# Patient Record
Sex: MALE | Race: BLACK OR AFRICAN AMERICAN | NOT HISPANIC OR LATINO | Employment: UNEMPLOYED | URBAN - METROPOLITAN AREA
[De-identification: names, ages, dates, MRNs, and addresses within clinical notes are randomized per-mention and may not be internally consistent; named-entity substitution may affect disease eponyms.]

---

## 2020-07-23 VITALS
HEART RATE: 58 BPM | TEMPERATURE: 98.6 F | SYSTOLIC BLOOD PRESSURE: 132 MMHG | BODY MASS INDEX: 28.77 KG/M2 | HEIGHT: 70 IN | DIASTOLIC BLOOD PRESSURE: 86 MMHG | OXYGEN SATURATION: 98 % | WEIGHT: 201 LBS

## 2020-07-23 PROCEDURE — 99282 EMERGENCY DEPT VISIT SF MDM: CPT

## 2020-07-23 ASSESSMENT — MIFFLIN-ST. JEOR: SCORE: 1857.98

## 2020-07-24 ENCOUNTER — HOSPITAL ENCOUNTER (EMERGENCY)
Facility: CLINIC | Age: 35
Discharge: HOME | End: 2020-07-24
Attending: EMERGENCY MEDICINE | Admitting: EMERGENCY MEDICINE
Payer: COMMERCIAL

## 2020-07-24 DIAGNOSIS — S02.5XXB OPEN FRACTURE OF TOOTH, INITIAL ENCOUNTER: ICD-10-CM

## 2020-07-24 RX ORDER — HYDROCODONE BITARTRATE AND ACETAMINOPHEN 5; 325 MG/1; MG/1
1 TABLET ORAL EVERY 6 HOURS PRN
Qty: 10 TABLET | Refills: 0 | Status: SHIPPED | OUTPATIENT
Start: 2020-07-24 | End: 2020-07-27

## 2020-07-24 RX ORDER — PENICILLIN V POTASSIUM 500 MG/1
500 TABLET, FILM COATED ORAL 4 TIMES DAILY
Qty: 40 TABLET | Refills: 0 | Status: SHIPPED | OUTPATIENT
Start: 2020-07-24 | End: 2020-08-03

## 2020-07-24 NOTE — ED TRIAGE NOTES
[place] [facility].  States it is broken/ States was seen at [facility], xrayed and told that he needs to take an antibiotic.  Script given to [facility] but not picked up yet.  Tylenol and advil not helping.  Waiting for appt to have tooth extracted.

## 2020-07-24 NOTE — ED AVS SNAPSHOT
Emergency Department  [address] [place] [id number]  Phone:  [phone]  Fax:  [phone]                                    Viktor Bourgeois   MRN: [id number]    Department:   Emergency Department   Date of Visit:  7/23/2020           After Visit Summary Signature Page    I have received my discharge instructions, and my questions have been answered. I have discussed any challenges I see with this plan with the nurse or doctor.    ..........................................................................................................................................  Patient/Patient Representative Signature      ..........................................................................................................................................  Patient Representative Print Name and Relationship to Patient    ..................................................               ................................................  Date                                   Time    ..........................................................................................................................................  Reviewed by Signature/Title    ...................................................              ..............................................  Date                                               Time          22EPIC Rev 08/18

## 2021-03-26 ENCOUNTER — APPOINTMENT (OUTPATIENT)
Dept: GENERAL RADIOLOGY | Facility: CLINIC | Age: 36
End: 2021-03-26
Attending: EMERGENCY MEDICINE
Payer: COMMERCIAL

## 2021-03-26 ENCOUNTER — HOSPITAL ENCOUNTER (EMERGENCY)
Facility: CLINIC | Age: 36
Discharge: HOME | End: 2021-03-26
Attending: EMERGENCY MEDICINE | Admitting: EMERGENCY MEDICINE
Payer: COMMERCIAL

## 2021-03-26 VITALS
OXYGEN SATURATION: 98 % | BODY MASS INDEX: 28.84 KG/M2 | SYSTOLIC BLOOD PRESSURE: 122 MMHG | HEART RATE: 62 BPM | RESPIRATION RATE: 14 BRPM | DIASTOLIC BLOOD PRESSURE: 77 MMHG | TEMPERATURE: 98.2 F | HEIGHT: 70 IN

## 2021-03-26 DIAGNOSIS — R07.89 CHEST WALL PAIN: ICD-10-CM

## 2021-03-26 LAB
ANION GAP SERPL CALCULATED.3IONS-SCNC: 4 MMOL/L (ref 3–14)
BASOPHILS # BLD AUTO: 0.1 10E9/L (ref 0–0.2)
BASOPHILS NFR BLD AUTO: 0.9 %
BUN SERPL-MCNC: 15 MG/DL (ref 7–30)
CALCIUM SERPL-MCNC: 8.8 MG/DL (ref 8.5–10.1)
CHLORIDE SERPL-SCNC: 107 MMOL/L (ref 94–109)
CO2 SERPL-SCNC: 27 MMOL/L (ref 20–32)
CREAT SERPL-MCNC: 0.82 MG/DL (ref 0.66–1.25)
D DIMER PPP FEU-MCNC: 0.3 UG/ML FEU (ref 0–0.5)
DIFFERENTIAL METHOD BLD: ABNORMAL
EOSINOPHIL # BLD AUTO: 0.2 10E9/L (ref 0–0.7)
EOSINOPHIL NFR BLD AUTO: 2.4 %
ERYTHROCYTE [DISTWIDTH] IN BLOOD BY AUTOMATED COUNT: 11.9 % (ref 10–15)
GFR SERPL CREATININE-BSD FRML MDRD: >90 ML/MIN/{1.73_M2}
GLUCOSE SERPL-MCNC: 104 MG/DL (ref 70–99)
HCT VFR BLD AUTO: 39.4 % (ref 40–53)
HGB BLD-MCNC: 13.6 G/DL (ref 13.3–17.7)
IMM GRANULOCYTES # BLD: 0 10E9/L (ref 0–0.4)
IMM GRANULOCYTES NFR BLD: 0.2 %
INTERPRETATION ECG - MUSE: NORMAL
LABORATORY COMMENT REPORT: NORMAL
LYMPHOCYTES # BLD AUTO: 4.1 10E9/L (ref 0.8–5.3)
LYMPHOCYTES NFR BLD AUTO: 50.4 %
MCH RBC QN AUTO: 30.1 PG (ref 26.5–33)
MCHC RBC AUTO-ENTMCNC: 34.5 G/DL (ref 31.5–36.5)
MCV RBC AUTO: 87 FL (ref 78–100)
MONOCYTES # BLD AUTO: 0.8 10E9/L (ref 0–1.3)
MONOCYTES NFR BLD AUTO: 9.5 %
NEUTROPHILS # BLD AUTO: 3 10E9/L (ref 1.6–8.3)
NEUTROPHILS NFR BLD AUTO: 36.6 %
NRBC # BLD AUTO: 0 10*3/UL
NRBC BLD AUTO-RTO: 0 /100
PLATELET # BLD AUTO: 281 10E9/L (ref 150–450)
POTASSIUM SERPL-SCNC: 3.8 MMOL/L (ref 3.4–5.3)
RBC # BLD AUTO: 4.52 10E12/L (ref 4.4–5.9)
SARS-COV-2 RNA RESP QL NAA+PROBE: NEGATIVE
SODIUM SERPL-SCNC: 138 MMOL/L (ref 133–144)
SPECIMEN SOURCE: NORMAL
TROPONIN I SERPL-MCNC: <0.015 UG/L (ref 0–0.04)
WBC # BLD AUTO: 8 10E9/L (ref 4–11)

## 2021-03-26 PROCEDURE — 84484 ASSAY OF TROPONIN QUANT: CPT | Performed by: EMERGENCY MEDICINE

## 2021-03-26 PROCEDURE — 80048 BASIC METABOLIC PNL TOTAL CA: CPT | Performed by: EMERGENCY MEDICINE

## 2021-03-26 PROCEDURE — 87635 SARS-COV-2 COVID-19 AMP PRB: CPT | Performed by: EMERGENCY MEDICINE

## 2021-03-26 PROCEDURE — 85379 FIBRIN DEGRADATION QUANT: CPT | Performed by: EMERGENCY MEDICINE

## 2021-03-26 PROCEDURE — 93005 ELECTROCARDIOGRAM TRACING: CPT

## 2021-03-26 PROCEDURE — 85025 COMPLETE CBC W/AUTO DIFF WBC: CPT | Performed by: EMERGENCY MEDICINE

## 2021-03-26 PROCEDURE — 250N000013 HC RX MED GY IP 250 OP 250 PS 637: Performed by: EMERGENCY MEDICINE

## 2021-03-26 PROCEDURE — 71046 X-RAY EXAM CHEST 2 VIEWS: CPT

## 2021-03-26 PROCEDURE — C9803 HOPD COVID-19 SPEC COLLECT: HCPCS

## 2021-03-26 PROCEDURE — 99285 EMERGENCY DEPT VISIT HI MDM: CPT | Mod: 25

## 2021-03-26 RX ORDER — IBUPROFEN 600 MG/1
600 TABLET, FILM COATED ORAL ONCE
Status: COMPLETED | OUTPATIENT
Start: 2021-03-26 | End: 2021-03-26

## 2021-03-26 RX ADMIN — IBUPROFEN 600 MG: 600 TABLET ORAL at 08:01

## 2021-03-26 ASSESSMENT — ENCOUNTER SYMPTOMS
APPETITE CHANGE: 0
FEVER: 0
SHORTNESS OF BREATH: 1
LIGHT-HEADEDNESS: 1
COUGH: 0

## 2021-03-26 NOTE — ED TRIAGE NOTES
Patient reports right sided chest pain with SOB. States pain radiates to right shoulder. Pain worse with inspiration. Has been going on for 2 weeks, denies any cough or fever.

## 2021-03-26 NOTE — ED PROVIDER NOTES
"    History     Chief Complaint:  Chest Pain and Shortness of Breath     HPI:   Viktor Bourgeois is a 35 year old male who presents with chest pain and shortness of breath. Patient reports intermittent mid-sternal chest pain and pressure radiating to his shoulders and back for the last 2-3 weeks. This is exacerbated with deep breaths. 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 However, he states his pain is otherwise non exertional. Patient also reports some lightheadedness. He has not taken any medications to relieve his symptoms. Patient is unable to sleep on his left side due to his chest pain, and has been sleeping on his right side. Denies any inciting trauma. Denies fever, cough, leg swelling, appetite change.     Review of Systems   Constitutional: Negative for appetite change and fever.   Respiratory: Positive for shortness of breath. Negative for cough.    Cardiovascular: Positive for chest pain. Negative for leg swelling.   Neurological: Positive for light-headedness.   All other systems reviewed and are negative.    Allergies:  No Known Allergies     Medications:    The patient is not currently taking any prescribed medications.    Past Medical History:    Patient denies any medical problems.    Social History:  Smoking status: yes  Alcohol use: occasional  Patient presents alone.    Physical Exam     Vitals:  Patient Vitals for the past 24 hrs:   BP Temp Pulse Resp SpO2 Height   03/26/21 0708 -- 98.2  F (36.8  C) -- -- -- --   03/26/21 0706 133/71 -- 56 14 100 % 1.778 m (5' 10\")       Physical Exam:  Constitutional: Middle aged black male. Sitting. No respiratory distress.  HENT: No signs of trauma.   Eyes: EOM are normal. Pupils are equal, round, and reactive to light.   Neck: Normal range of motion. No JVD present. No cervical adenopathy.  Cardiovascular: Regular rhythm.  Exam reveals no gallop and no friction rub. 2+ radial pulses bilaterally. No murmur " heard.  Pulmonary/Chest: Bilateral breath sounds normal. No wheezes, rhonchi or rales. Chest wall nontender.  Abdominal: Soft. No tenderness. No rebound or guarding.   Musculoskeletal: No edema. No tenderness.   Lymphadenopathy: No lymphadenopathy.   Neurological: Alert and oriented to person, place, and time. Normal strength. Coordination normal.   Skin: Skin is warm and dry. No rash noted. No erythema.     Emergency Department Course     ECG:  Taken at 07:06:52.  Read by Jimy Matos MD.    Rate 63 bpm. NV interval 172. QRS duration 80. QT/QTc 382/390. P-R-T axes 75 16 44.    NSR with sinus arrhythmia.    Imaging:  XR Chest  Negative chest  Per radiology.    Laboratory:  CBC: WBC 8.0, HGB 13.6,      BMP: Glucose 104 o/w WNL (Creatinine 0.82)     Troponin (Collected 0720): <0.015    D dimer: 0.3    Asymptomatic COVID-19 virus by PCR: neg    Emergency Department Course:  Reviewed:  Past medical records, Care Everywhere, nursing notes, and vitals reviewed.     Assessments:  0724 I performed an exam of the patient and obtained history, as documented above.  0906 Patient rechecked and updated.     Interventions:  0801 Ibuprofen, 600 mg, PO    Disposition:  The patient was discharged to home.     Impression & Plan         Covid-19:  Viktor Bourgeois was evaluated during a global COVID-19 pandemic, which necessitated consideration that the patient might be at risk for infection with the SARS-CoV-2 virus that causes COVID-19.   Applicable protocols for evaluation were followed during the patient's care.   COVID-19 was considered as part of the patient's evaluation. The plan for testing is:  a test was obtained during this visit.    Medical Decision Making:  Viktor Bourgeois is a 35 year old male who presents to the emergency department today for evaluation of chest pain. Patient works with [employer] at times and notes that this particularly makes his pain worse. It has been intermittent and has been going  on for at least a month. 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 He states when he takes a big breath it sometimes hurts. He notices this up in the right shoulder area. He does smoke. Examination here is relatively unremarkable. No respiratory distress. Pulse ox is near 100%. Chest XR, EKG, D dimer, troponin are all unremarkable. COVID was negative. Patient's pain does not seem likely due to coronary disease, PE, or dissection. Most likely it is related to his work with musculoskeletal chest all discomfort. I recommend ibuprofen, cold compresses, and follow up with primary.     Diagnosis:    ICD-10-CM    1. Chest wall pain  R07.89 Asymptomatic SARS-CoV-2 COVID-19 Virus (Coronavirus) by PCR            Scribe Disclosure:  I, Fabiana Lou, am serving as a scribe at 7:24 AM on 3/26/2021 to document services personally performed by Jimy Matos MD based on my observations and the provider's statements to me.       Fabiana Lou  3/26/2021     Jimy Matos MD  03/26/21 1532

## 2022-01-04 ENCOUNTER — HOSPITAL ENCOUNTER (EMERGENCY)
Facility: CLINIC | Age: 37
Discharge: HOME | End: 2022-01-04
Attending: PHYSICIAN ASSISTANT | Admitting: PHYSICIAN ASSISTANT
Payer: COMMERCIAL

## 2022-01-04 VITALS
WEIGHT: 195 LBS | TEMPERATURE: 99.1 F | DIASTOLIC BLOOD PRESSURE: 58 MMHG | RESPIRATION RATE: 18 BRPM | HEIGHT: 70 IN | OXYGEN SATURATION: 98 % | BODY MASS INDEX: 27.92 KG/M2 | HEART RATE: 81 BPM | SYSTOLIC BLOOD PRESSURE: 124 MMHG

## 2022-01-04 DIAGNOSIS — Z20.822 SUSPECTED 2019 NOVEL CORONAVIRUS INFECTION: ICD-10-CM

## 2022-01-04 PROCEDURE — 99283 EMERGENCY DEPT VISIT LOW MDM: CPT

## 2022-01-04 PROCEDURE — 87637 SARSCOV2&INF A&B&RSV AMP PRB: CPT | Performed by: PHYSICIAN ASSISTANT

## 2022-01-04 ASSESSMENT — ENCOUNTER SYMPTOMS
NAUSEA: 0
COUGH: 1
DIARRHEA: 0
DYSURIA: 1
HEADACHES: 1
ABDOMINAL PAIN: 0
SHORTNESS OF BREATH: 0
VOMITING: 0

## 2022-01-04 ASSESSMENT — MIFFLIN-ST. JEOR: SCORE: 1820.76

## 2022-01-05 LAB
FLUAV RNA SPEC QL NAA+PROBE: NEGATIVE
FLUBV RNA RESP QL NAA+PROBE: NEGATIVE
RSV RNA SPEC NAA+PROBE: NEGATIVE
SARS-COV-2 RNA RESP QL NAA+PROBE: POSITIVE

## 2022-01-05 NOTE — ED PROVIDER NOTES
"  History     Chief Complaint:  Covid Concern       37 y/o male presents for COVID testing.  Reports past few days with chills and aches, mild HA, mild cough.  Denies CP/dypsnea.  Denies N/V/D.  Notes some dysuria when asked.     Denies chronic health problems  + COVID vaccinated    Local resident  Student: classes recently canceled due to high COVID numbers but pt does not know of specific exposure  PCP established      The history is provided by the patient and medical records. No  was used.      ROS:  Review of Systems   Constitutional:        Chills. No overt fevers   Respiratory: Positive for cough. Negative for shortness of breath.    Cardiovascular: Negative for chest pain.   Gastrointestinal: Negative for abdominal pain, diarrhea, nausea and vomiting.   Genitourinary: Positive for dysuria.   Neurological: Positive for headaches.   All other systems reviewed and are negative.    Allergies:  No Known Allergies     Medications:    No current outpatient medications on file.    Past Medical History:    History reviewed. No pertinent past medical history.  There is no problem list on file for this patient.     Past Surgical History:    History reviewed. No pertinent surgical history.     Family History:    family history is not on file.    Social History:   reports that he has been smoking. He uses smokeless tobacco. He reports current alcohol use. He reports that he does not use drugs.  PCP: [facility]     Physical Exam   Patient Vitals for the past 24 hrs:   BP Temp Temp src Pulse Resp SpO2 Height Weight   01/04/22 1949 124/58 99.1  F (37.3  C) Oral 81 18 98 % 1.778 m (5' 10\") 88.5 kg (195 lb)        Physical Exam  Vitals and nursing note reviewed.   Constitutional:       General: He is not in acute distress.     Appearance: Normal appearance. He is not ill-appearing, toxic-appearing or diaphoretic.   HENT:      Head: Normocephalic.      Right Ear: External ear " normal.      Left Ear: External ear normal.      Mouth/Throat:      Comments: Mask in place. Clear speech.   Eyes:      Conjunctiva/sclera: Conjunctivae normal.   Cardiovascular:      Rate and Rhythm: Normal rate and regular rhythm.      Pulses: Normal pulses.      Heart sounds: Normal heart sounds.   Pulmonary:      Effort: Pulmonary effort is normal. No respiratory distress.      Breath sounds: Normal breath sounds.   Abdominal:      Palpations: Abdomen is soft.      Tenderness: There is no abdominal tenderness. There is no right CVA tenderness or left CVA tenderness.   Musculoskeletal:         General: Normal range of motion.      Cervical back: Normal range of motion and neck supple. No rigidity.   Skin:     General: Skin is warm.      Capillary Refill: Capillary refill takes less than 2 seconds.   Neurological:      General: No focal deficit present.      Mental Status: He is alert.   Psychiatric:         Mood and Affect: Mood normal.         Behavior: Behavior normal.         Thought Content: Thought content normal.         Judgment: Judgment normal.       Emergency Department Course     Laboratory:  COVID/FLU pending at time of discharge    Emergency Department Course:       Reviewed:  I reviewed nursing notes, vitals and past medical history    Assessments:  2100: I obtained history and examined the patient as noted above.   2134: Call to lab to check on ETA of test results, in labing at [facility] now (sent out due to [facility]). Pt wants to go. Was able to set up his [employer] account.     Disposition:  The patient was discharged to home.     Impression & Plan      Covid-19  Viktor Bourgeois was evaluated during a global COVID-19 pandemic, which necessitated consideration that the patient might be at risk for infection with the SARS-CoV-2 virus that causes COVID-19.   Applicable protocols for evaluation were followed during the patient's care.   COVID-19 was considered as part of the patient's evaluation.  The plan for testing is:  a test was obtained during this visit and pending at time of D/C    Medical Decision Makin y/o male presents for COVID testing.  Reports past few days with chills and aches, mild HA, mild cough.  Denies CP/dypsnea.  Denies N/V/D.  Notes some dysuria when asked.     On exam, well appearing.  Discussed S/S susp for COVID.  Test pending.  Pt initially wanted to stay for results as did not want to return home if positive. However while waiting changed his mind, was able to set up [employer] account and will review his results there.  He is felt stable for discharge as no hypoxia or tachycardia, no CP/dypsnea.  If COVID neg, suspect other viral etiology.  Did note mild dysuria a few days ago on ROS.  No abd pain or flank pain. Offered UA testing, pt initially agreeable but then changed mind and wants to F/u with established PCP instead. This is felt reasonable.  Pt educated on S/S that should prompt ED re-eval.  Questions answered. Verbalized understanding. Comfortable with plan and appreciative.     Diagnosis:    ICD-10-CM    1. Suspected 2019 novel coronavirus infection  Z20.822       Discharge Medications:  New Prescriptions    No medications on file      2022   Nelly Keating PA-C [employer], Leah M, PA-C  [phone]

## 2022-01-05 NOTE — ED TRIAGE NOTES
[facility]  ED Arrival Note    Arrives through triage stating that the thinks that he may have been exposed to covid. Pt c/o generalized muscle pain, intermittent fever and chills and headache since Thursday. Pt states he is vaccinated and wants to be tested to see if he as has been exposed.    Visitors during triage: None    Triage Interventions: N/A and COVID Test    Ambulatory: Yes    Meets Stroke Criteria?: No    Meets Trauma Criteria?: No    Shock Index: N/A, for provider reference    Directed to: Main ED

## 2022-01-05 NOTE — ED NOTES
1st Call at 1913. No response  Suni Seo RN,.......................................... 1/4/2022   7:13 PM

## 2022-01-05 NOTE — DISCHARGE INSTRUCTIONS
-Your symptoms are suspicious for COVID.  A test was sent. You can and should review the results on [employer].   -You have declines urine testing today for reports dysuria. Be sure to follow-up with your primary care provider.  Return to the ER for worsening urinary symptoms, abdominal pain, vomiting, or pain/swelling to your testicles    Discharge Instructions  COVID-19    COVID-19 is the disease caused by a new coronavirus. The virus spreads from person-to-person primarily by droplets when an infected person coughs or sneezes and the droplets are then breathed in by another person. There are tests available to diagnose COVID-19. You may have been diagnosed with COVID, may be being tested for COVID and have a pending test result, or may have been exposed to COVID.    Symptoms of COVID-19  Many people have no symptoms or mild symptoms.  Symptoms may usually appear 4 to 5 days (up to 14 days) after contact with a person with COVID-19. Some people will get severe symptoms and pneumonia. Usual symptoms are:     ? Fever  ? Cough  ? Trouble breathing    Less common symptoms are: Headache, body aches, sore throat, sneezing, diarrhea, loss of taste or smell.    Isolation and Quarantine    You may have been seen because you have symptoms, had an exposure, or had some other concern about possible COVID. The best way to stop the spread of the virus is to avoid contact with others.    Isolation refers to sick people staying away from people who are not sick. A person in quarantine is limiting activity because they were exposed and are waiting to see if they might become sick.    If you test positive for COVID, you should stay home (isolation) for at least 10 days after your symptoms began, and for 24 hours with no fever and improvement of symptoms--whichever is longer. (Your fever should be gone for 24 hours without using fever-reducing medicine). If you have no symptoms, you should stay home (isolation) for 10 days from the day  of the test. If you have been vaccinated for COVID, the vaccination will not cause you to test positive so a positive test result generally is a  true positive .    For example, if you have a fever and cough for 6 days, you need to stay home 4 more days with no fever for a total of 10 days. Or, if you have a fever and cough for 10 days, you need to stay home one more day with no fever for a total of 11 days.    If you have a high-risk exposure to COVID (you spent 15 minutes or more within six feet of somebody who has COVID), you should stay home (quarantine) for 14 days, unless you are vaccinated. Even if you test negative for COVID, the CDC recommends a 14-day quarantine from the time of your last exposure to that individual (unless you are vaccinated). There are options for a shortened (<14 day quarantine) you can review at:  https://[email or web address].[facility]./diseases/coronavirus/close.html#long    If you live in the same house as somebody with COVID and cannot separate from them, you will need to quarantine for 14-days after that person's isolation (infectious) period. That means that you may need to quarantine for 24-days after that person became symptomatic/ill.    If you are vaccinated and do not develop symptoms, you do not need to quarantine after exposure.    If you have symptoms but a negative test, you should stay at home until you are symptom-free and without fever for 24 hours, using the same judgment you would for when it is safe to return to work/school from strep throat, influenza, or the common cold. If you worsen, you should consider being re-evaluated.    If you are being tested for COVID because of symptoms and your test is pending, you should stay home until you know your test result.    If I have COVID, how should I protect myself and others?    Do not go to work or school. Have a friend or relative do your shopping. Do not use public transportation (bus, train) or ridesharing (Lyft,  Uber).    Separate yourself from other people in your home. As much as possible, you should stay in one room and away from other people in your home. Also, use a separate bathroom, if possible. Avoid handling pets or other animals while sick.     Wear a facemask if you need to be around other people and cover your mouth and nose with a tissue when you cough or sneeze.     Avoid sharing personal household items. You should not share dishes, drinking glasses, forks/knives/spoons, towels, or bedding with other people in your home. After using these items, they should be washed with soap and water. Clean parts of your home that are touched often (doorknobs, faucets, countertops, etc.) daily.     Wash your hands often with soap and water for at least 20 seconds or use an alcohol-based hand  containing at least 60% alcohol.     Avoid touching your face.    Treat your symptoms. You can take Acetaminophen (Tylenol) to treat body aches and fever as needed for comfort. Ibuprofen (Advil or Motrin) can be used as well if you still have symptoms after taking Tylenol. Drink fluids. Rest.    Watch for worsening symptoms such as shortness of breath/difficulty breathing or very severe weakness.    Employers/workplaces are being asked by the Centers for Disease Control (CDC) to not request notes/documentation for you to return to work or prove that you were ill. You may choose to show your employer this paperwork. Also, repeat testing should not be required to return to work.    Exercise/Sports in rare cases, COVID could affect your heart in a way that makes exercise or participation in sports dangerous.    If you have a mild COVID illness (fever, cough, sore throat, and similar symptoms but no difficulty breathing or abnormalities of the lung): After your COVID symptoms have resolved, wait 14-days before returning to activity.  If you have more than a mild illness (meaning that you have problems with your breathing or  lungs) or if you participate in competitive or strenuous activity or have a history of heart disease: Please see your primary doctor/provider prior to return to activity/competition.    Antibody treatments are available for patients with mild to moderate COVID illness in order to prevent severe illness. In general, only patients with risk factors for severe illness are eligible for treatment. For more information, to see if you are eligible, and to find treatment, go to the Critical access hospital:  [email or web address]./diseases/coronavirus/mnrap.html     Return to the Emergency Department if:    If you are developing worsening breathing, shortness of breath, or feel worse you should seek medical attention.  If you are uncertain, contact your health care provider/clinic. If you need emergency medical attention, call 911 and tell them you have been ill.    The examination and treatment you have received in the Emergency Department has been rendered on an emergency basis only and not intended to be a substitute for complete ongoing medical care.

## 2022-01-30 ENCOUNTER — HEALTH MAINTENANCE LETTER (OUTPATIENT)
Age: 37
End: 2022-01-30

## 2022-09-24 ENCOUNTER — HEALTH MAINTENANCE LETTER (OUTPATIENT)
Age: 37
End: 2022-09-24

## 2022-11-02 ENCOUNTER — HOSPITAL ENCOUNTER (EMERGENCY)
Facility: CLINIC | Age: 37
Discharge: HOME | End: 2022-11-02
Attending: EMERGENCY MEDICINE | Admitting: EMERGENCY MEDICINE
Payer: COMMERCIAL

## 2022-11-02 VITALS
DIASTOLIC BLOOD PRESSURE: 75 MMHG | HEART RATE: 65 BPM | RESPIRATION RATE: 16 BRPM | SYSTOLIC BLOOD PRESSURE: 125 MMHG | OXYGEN SATURATION: 99 % | TEMPERATURE: 98.1 F

## 2022-11-02 DIAGNOSIS — J06.9 UPPER RESPIRATORY TRACT INFECTION, UNSPECIFIED TYPE: ICD-10-CM

## 2022-11-02 LAB
FLUAV RNA SPEC QL NAA+PROBE: NEGATIVE
FLUBV RNA RESP QL NAA+PROBE: NEGATIVE
RSV RNA SPEC NAA+PROBE: NEGATIVE
SARS-COV-2 RNA RESP QL NAA+PROBE: NEGATIVE

## 2022-11-02 PROCEDURE — 99283 EMERGENCY DEPT VISIT LOW MDM: CPT | Mod: CS

## 2022-11-02 PROCEDURE — C9803 HOPD COVID-19 SPEC COLLECT: HCPCS

## 2022-11-02 PROCEDURE — 87637 SARSCOV2&INF A&B&RSV AMP PRB: CPT | Performed by: EMERGENCY MEDICINE

## 2022-11-02 ASSESSMENT — ENCOUNTER SYMPTOMS
COUGH: 0
FATIGUE: 1
SORE THROAT: 1
MYALGIAS: 1
SHORTNESS OF BREATH: 1

## 2022-11-02 NOTE — LETTER
November 2, 2022      To Whom It May Concern:      Viktor Bourgeois was seen in our Emergency Department today, 11/02/22.  His COVID test was NEGATIVE.  He may return to work.    Sincerely,        Stefany Nava RN

## 2022-11-02 NOTE — ED PROVIDER NOTES
History     Chief Complaint:  Multiple Symptoms     HPI:  The history is provided by the patient.      Viktor Bourgeois is a 37 year old male with history of opioid use disorder who presents with fatigue, generalized body aches, and sore throat which he initially noticed when he woke up 3 days ago. After the onset of these symptoms, he took Tylenol with some relief of his symptoms. 2 days ago, he reports that he was at work when he began feeling short of breath. He left work early and took a dose of Tylenol, again with some symptom relief. His shortness of breath has improved since that time. No cough. He denies history of strep throat infection.    Review of Systems   Constitutional: Positive for fatigue.   HENT: Positive for sore throat.    Respiratory: Positive for shortness of breath. Negative for cough.    Musculoskeletal: Positive for myalgias.   All other systems reviewed and are negative.    Allergies:  The patient has no known allergies.     Medications:  Trazodone  Chantix  Narcan  Clonidine    Past Medical History:     Cervical herniated disc  Opioid use disorder    Social History:  The patient presents to the ED alone.  The patient presents to the ED via private car.     Physical Exam     Patient Vitals for the past 24 hrs:   BP Temp Temp src Pulse Resp SpO2   11/02/22 1450 125/75 98.1  F (36.7  C) Temporal 65 16 99 %     Physical Exam  SKIN:  Warm, dry.  HEMATOLOGIC/IMMUNOLOGIC/LYMPHATIC:  No cervical adenopathy.  HENT:  Moist oral mucosa.  No oropharyngeal inflammation/erythema.  Noninflamed nonexudative tonsils.  EYES:  Conjunctivae normal.  Anicteric.  CARDIOVASCULAR:  Regular rate and rhythm.  No murmur.  RESPIRATORY:  No respiratory distress, breath sounds equal and normal.  GASTROINTESTINAL:  Soft, nontender abdomen.  MUSCULOSKELETAL: Normal body habitus.  NEUROLOGIC:  Alert, conversant.  PSYCHIATRIC:  Normal mood.    Emergency Department Course     Laboratory:  Labs Ordered and Resulted from  Time of ED Arrival to Time of ED Departure   INFLUENZA A/B & SARS-COV2 PCR MULTIPLEX - Normal       Result Value    Influenza A PCR Negative      Influenza B PCR Negative      RSV PCR Negative      SARS CoV2 PCR Negative        Emergency Department Course:       Reviewed:  I reviewed nursing notes, vitals, past medical history and Care Everywhere    Assessments:  1454 I obtained history and examined the patient as noted above. I explained findings.     Disposition:  The patient was discharged to home.     Impression & Plan     Medical Decision Making:  Viktor Bourgeois is a 37 year old male who presents to the emergency department for evaluation of symptoms of infectious disease.  He was primarily worried about COVID-19.  COVID test was negative.  In addition influenza and RSV negative.  Given the patient's symptoms I recommended a strep test and a chest x-ray although he declined.  States he rather follow-up with his doctor if symptoms persist.  Requested a note for work so he can return given his negative COVID test.  This was provided.  Advised primary care follow-up.    Diagnosis:    ICD-10-CM    1. Upper respiratory tract infection, unspecified type  J06.9         Scribe Disclosure:  I, Irene Rouse, am serving as a scribe at 2:52 PM on 11/2/2022 to document services personally performed by Sreekanth Jang MD based on my observations and the provider's statements to me.      Sreekanth Jang MD  11/02/22 2046

## 2023-05-08 ENCOUNTER — HEALTH MAINTENANCE LETTER (OUTPATIENT)
Age: 38
End: 2023-05-08

## 2024-07-14 ENCOUNTER — HEALTH MAINTENANCE LETTER (OUTPATIENT)
Age: 39
End: 2024-07-14

## 2025-03-15 PROCEDURE — 99283 EMERGENCY DEPT VISIT LOW MDM: CPT

## 2025-03-16 ENCOUNTER — HOSPITAL ENCOUNTER (EMERGENCY)
Facility: CLINIC | Age: 40
Discharge: HOME | End: 2025-03-16
Attending: EMERGENCY MEDICINE | Admitting: EMERGENCY MEDICINE
Payer: COMMERCIAL

## 2025-03-16 VITALS
SYSTOLIC BLOOD PRESSURE: 123 MMHG | HEIGHT: 70 IN | OXYGEN SATURATION: 100 % | WEIGHT: 161 LBS | RESPIRATION RATE: 16 BRPM | HEART RATE: 92 BPM | DIASTOLIC BLOOD PRESSURE: 80 MMHG | BODY MASS INDEX: 23.05 KG/M2 | TEMPERATURE: 97.2 F

## 2025-03-16 DIAGNOSIS — F11.20 FENTANYL USE DISORDER, MODERATE (H): ICD-10-CM

## 2025-03-16 DIAGNOSIS — N50.89 SCROTAL IRRITATION: ICD-10-CM

## 2025-03-16 DIAGNOSIS — R20.8 BURNING SENSATION OF SKIN: ICD-10-CM

## 2025-03-16 DIAGNOSIS — F15.10 METHAMPHETAMINE ABUSE (H): ICD-10-CM

## 2025-03-16 LAB
ALBUMIN UR-MCNC: NEGATIVE MG/DL
APPEARANCE UR: CLEAR
BILIRUB UR QL STRIP: NEGATIVE
C TRACH DNA SPEC QL PROBE+SIG AMP: NEGATIVE
COLOR UR AUTO: NORMAL
GLUCOSE UR STRIP-MCNC: NEGATIVE MG/DL
HGB UR QL STRIP: NEGATIVE
KETONES UR STRIP-MCNC: NEGATIVE MG/DL
LEUKOCYTE ESTERASE UR QL STRIP: NEGATIVE
N GONORRHOEA DNA SPEC QL NAA+PROBE: NEGATIVE
NITRATE UR QL: NEGATIVE
PH UR STRIP: 5 [PH] (ref 5–7)
RBC URINE: 0 /HPF
SP GR UR STRIP: 1.01 (ref 1–1.03)
SPECIMEN TYPE: NORMAL
UROBILINOGEN UR STRIP-MCNC: NORMAL MG/DL
WBC URINE: 0 /HPF

## 2025-03-16 PROCEDURE — 87491 CHLMYD TRACH DNA AMP PROBE: CPT | Performed by: EMERGENCY MEDICINE

## 2025-03-16 PROCEDURE — 81001 URINALYSIS AUTO W/SCOPE: CPT | Performed by: EMERGENCY MEDICINE

## 2025-03-16 ASSESSMENT — ACTIVITIES OF DAILY LIVING (ADL): ADLS_ACUITY_SCORE: 41

## 2025-03-16 ASSESSMENT — COLUMBIA-SUICIDE SEVERITY RATING SCALE - C-SSRS
1. IN THE PAST MONTH, HAVE YOU WISHED YOU WERE DEAD OR WISHED YOU COULD GO TO SLEEP AND NOT WAKE UP?: NO
6. HAVE YOU EVER DONE ANYTHING, STARTED TO DO ANYTHING, OR PREPARED TO DO ANYTHING TO END YOUR LIFE?: NO
2. HAVE YOU ACTUALLY HAD ANY THOUGHTS OF KILLING YOURSELF IN THE PAST MONTH?: NO

## 2025-03-16 NOTE — DISCHARGE INSTRUCTIONS
As we discussed, there is no evidence of an infection or any other type of trauma to your scrotum.  Please come back to the ER immediately with any other concerns you have, and to use your Suboxone at home and do your best to not use any methamphetamines or fentanyl.  Please do check in with your regular doctor in the next 1 week to make sure that you are getting better.

## 2025-03-16 NOTE — ED PROVIDER NOTES
"  Emergency Department Note      History of Present Illness     Chief Complaint  Penis/Scrotum Problem    HPI  Viktor Bourgeois is a 39 year old male who presents to the emergency room with concerns that he has a burning sensation to his scrotum.  He also was concerned about a burning sensation to the tip of his penis, and he notes that he does use fentanyl and methamphetamines almost every day.  He has adult sternal complaints, and is asking how he can be helped with withdrawal at home for his fentanyl if he decides to stop.  He states that he has not been sexually active, and states that the pain is really pain but more of an irritation.  Denies any focal scrotal pain or testicular pain.  Denies any abdominal pain.      Independent Historian  no    Review of External Notes  Yes I have reviewed the patient's last ER visit note from 1- or the patient was seen for methamphetamine abuse.      Past Medical History   Medical History and Problem List  No past medical history on file.    Medications  No current outpatient medications on file.      Surgical History   No past surgical history on file.      Physical Exam   Patient Vitals for the past 24 hrs:   BP Temp Pulse Resp SpO2 Height Weight   03/16/25 0001 123/80 97.2  F (36.2  C) 92 16 100 % 1.778 m (5' 10\") 73 kg (161 lb)       Physical Exam  Vitals: reviewed by me  General: Pt seen on [facility], [facility], cooperative, and alert to conversation  Eyes: Tracking well, clear conjunctiva BL  ENT: MMM, midline trachea.   Lungs: No tachypnea, no accessory muscle use. No respiratory distress.   CV: Rate as above  Abd:  exam notably has vertical testicles, that are nontender, no edema, grossly normal exam, no obvious skin changes noted, no irritation to the glans, point of patient's concern is on the scrotal wall at midline and between the 2 testicles, and I do not see any abnormality in this area and it is nontender to palpation.  MSK: no joint effusion.  No " evidence of trauma  Skin: No rash  Neuro: Clear speech and no facial droop.  Psych: Not RIS, no e/o AH/VH      Diagnostics   Lab Results   Labs Ordered and Resulted from Time of ED Arrival to Time of ED Departure   ROUTINE UA WITH MICROSCOPIC REFLEX TO CULTURE - Normal       Result Value    Color Urine Straw      Appearance Urine Clear      Glucose Urine Negative      Bilirubin Urine Negative      Ketones Urine Negative      Specific Gravity Urine 1.009      Blood Urine Negative      pH Urine 5.0      Protein Albumin Urine Negative      Urobilinogen Urine Normal      Nitrite Urine Negative      Leukocyte Esterase Urine Negative      RBC Urine 0      WBC Urine 0     CHLAMYDIA TRACHOMATIS/NEISSERIA GONORRHOEAE BY PCR         ED Course      Medications Administered   Medications - No data to display             Optional/Additional Documentation  Stress/Adjustment Disorders       Medical Decision Making / Diagnosis         MDM  This is a 39-year-old male with a history of methamphetamine abuse and polysubstance use who presents to the ER for burning sensation of his skin.  I think this may be related to the meth as I cannot see any abnormality on my exam of his scrotum and certainly his testicles are nontender nonswollen and grossly unremarkable.  I do not think that an ultrasound would be beneficial, I do not think that he needs to come into the hospital for this or follow-up with urologist.  I think that we can recommend topical ointments to help keep that area moisturized if it is becoming dry or burning, and more importantly we have also recommended that he stop using fentanyl and methamphetamines.  He does tell me that he has Suboxone at home if he decides to stop using fentanyl.    ICD-10 Codes:    ICD-10-CM    1. Scrotal irritation  N50.89       2. Burning sensation of skin  R20.8       3. Methamphetamine abuse (H)  F15.10       4. Fentanyl use disorder, moderate (H)  F11.20              Discharge  Medications  There are no discharge medications for this patient.                 Shemar Badillo MD  03/16/25 [id number]

## 2025-03-16 NOTE — ED TRIAGE NOTES
Pt reports using meth yesterday that he believes was laced with something that has caused bilat testicle pain and burning with urination. Pt also feels cold.      Triage Assessment (Adult)       Row Name 03/16/25 0018          Triage Assessment    Airway WDL WDL        Respiratory WDL    Respiratory WDL WDL        Cardiac WDL    Cardiac WDL WDL        Peripheral/Neurovascular WDL    Peripheral Neurovascular WDL WDL        Cognitive/Neuro/Behavioral WDL    Cognitive/Neuro/Behavioral WDL WDL

## 2025-07-19 ENCOUNTER — HEALTH MAINTENANCE LETTER (OUTPATIENT)
Age: 40
End: 2025-07-19